# Patient Record
Sex: MALE | Race: ASIAN | URBAN - METROPOLITAN AREA
[De-identification: names, ages, dates, MRNs, and addresses within clinical notes are randomized per-mention and may not be internally consistent; named-entity substitution may affect disease eponyms.]

---

## 2021-04-28 ENCOUNTER — OFFICE VISIT (OUTPATIENT)
Dept: URBAN - METROPOLITAN AREA CLINIC 91 | Facility: CLINIC | Age: 71
End: 2021-04-28
Payer: COMMERCIAL

## 2021-04-28 DIAGNOSIS — H04.123 DRY EYE SYNDROME OF BILATERAL LACRIMAL GLANDS: ICD-10-CM

## 2021-04-28 DIAGNOSIS — H40.013 OPEN ANGLE WITH BORDERLINE FINDINGS, LOW RISK, BILATERAL: ICD-10-CM

## 2021-04-28 PROCEDURE — 99214 OFFICE O/P EST MOD 30 MIN: CPT | Performed by: OPHTHALMOLOGY

## 2021-04-28 ASSESSMENT — INTRAOCULAR PRESSURE
OS: 14
OD: 15

## 2021-04-28 NOTE — IMPRESSION/PLAN
Impression: Type 2 diabetes mellitus without complications: B45.5. Plan: No evidence of diabetic retinopathy seen on today's exam in either eye. Ophthalmic pathologies associated with diabetes explained to patient. I have stressed the importance of patient maintaining good blood sugar control. I will continue to monitor patient's ocular status closely. Patient will return for follow-up appointment and repeat dilation.

## 2021-07-28 ENCOUNTER — OFFICE VISIT (OUTPATIENT)
Dept: URBAN - METROPOLITAN AREA CLINIC 91 | Facility: CLINIC | Age: 71
End: 2021-07-28
Payer: COMMERCIAL

## 2021-07-28 DIAGNOSIS — H40.1131 PRIMARY OPEN-ANGLE GLAUCOMA, BILATERAL, MILD STAGE: Primary | ICD-10-CM

## 2021-07-28 PROCEDURE — 92133 CPTRZD OPH DX IMG PST SGM ON: CPT | Performed by: OPHTHALMOLOGY

## 2021-07-28 PROCEDURE — 99213 OFFICE O/P EST LOW 20 MIN: CPT | Performed by: OPHTHALMOLOGY

## 2021-07-28 PROCEDURE — 92083 EXTENDED VISUAL FIELD XM: CPT | Performed by: OPHTHALMOLOGY

## 2021-07-28 RX ORDER — LATANOPROST 50 UG/ML
0.005 % SOLUTION OPHTHALMIC
Qty: 7.5 | Refills: 1 | Status: INACTIVE
Start: 2021-07-28 | End: 2022-02-09

## 2021-07-28 ASSESSMENT — INTRAOCULAR PRESSURE
OS: 15
OD: 15

## 2021-07-28 NOTE — IMPRESSION/PLAN
Impression: Primary open-angle glaucoma, bilateral, mild stage: H40.1131. Plan: Due to progression in VF will start Latanoprost QHS OU. IOP's stable. Discussed primary open angle glaucoma of both eyes. I have stressed importance of patient compliance with follow-up appointments and using glaucoma drops as directed. I have explained the risk of irreversible vision loss and possible blindness associated with glaucoma. Will continue to monitor patient's glaucoma status with OCTg, visual field testing and fundus photography.

## 2021-08-27 ENCOUNTER — OFFICE VISIT (OUTPATIENT)
Dept: URBAN - METROPOLITAN AREA CLINIC 91 | Facility: CLINIC | Age: 71
End: 2021-08-27
Payer: COMMERCIAL

## 2021-08-27 PROCEDURE — 92014 COMPRE OPH EXAM EST PT 1/>: CPT | Performed by: OPHTHALMOLOGY

## 2021-08-27 ASSESSMENT — INTRAOCULAR PRESSURE
OD: 14
OS: 12

## 2021-12-22 ENCOUNTER — OFFICE VISIT (OUTPATIENT)
Dept: URBAN - METROPOLITAN AREA CLINIC 91 | Facility: CLINIC | Age: 71
End: 2021-12-22
Payer: COMMERCIAL

## 2021-12-22 DIAGNOSIS — E11.9 TYPE 2 DIABETES MELLITUS WITHOUT COMPLICATIONS: ICD-10-CM

## 2021-12-22 PROCEDURE — 99213 OFFICE O/P EST LOW 20 MIN: CPT | Performed by: OPHTHALMOLOGY

## 2021-12-22 ASSESSMENT — INTRAOCULAR PRESSURE
OD: 14
OS: 9

## 2021-12-22 NOTE — IMPRESSION/PLAN
Impression: Primary open-angle glaucoma, bilateral, mild stage: H40.1131. Plan: IOP's stable OU, Continue to Latanoprost QHS OU. Discussed primary open angle glaucoma of both eyes. I have stressed importance of patient compliance with follow-up appointments and using glaucoma drops as directed. I have explained the risk of irreversible vision loss and possible blindness associated with glaucoma. Will continue to monitor patient's glaucoma status with OCTg, visual field testing and fundus photography.

## 2021-12-22 NOTE — IMPRESSION/PLAN
Impression: Type 2 diabetes mellitus without complications: H88.8. Plan: No evidence of diabetic retinopathy seen on today's exam in either eye. Ophthalmic pathologies associated with diabetes explained to patient. I have stressed the importance of patient maintaining good blood sugar control. I will continue to monitor patient's ocular status closely. Patient will return for follow-up appointment and repeat dilation.

## 2022-01-28 ENCOUNTER — OFFICE VISIT (OUTPATIENT)
Dept: URBAN - METROPOLITAN AREA CLINIC 91 | Facility: CLINIC | Age: 72
End: 2022-01-28
Payer: MEDICARE

## 2022-01-28 DIAGNOSIS — H25.13 AGE-RELATED NUCLEAR CATARACT, BILATERAL: ICD-10-CM

## 2022-01-28 DIAGNOSIS — H35.033 HYPERTENSIVE RETINOPATHY, BILATERAL: Primary | ICD-10-CM

## 2022-01-28 DIAGNOSIS — H52.4 PRESBYOPIA: ICD-10-CM

## 2022-01-28 PROCEDURE — 99214 OFFICE O/P EST MOD 30 MIN: CPT | Performed by: OPHTHALMOLOGY

## 2022-01-28 PROCEDURE — 92134 CPTRZ OPH DX IMG PST SGM RTA: CPT | Performed by: OPHTHALMOLOGY

## 2022-01-28 ASSESSMENT — VISUAL ACUITY
OS: 20/25
OD: 20/20

## 2022-01-28 ASSESSMENT — INTRAOCULAR PRESSURE
OD: 12
OS: 10

## 2022-01-28 NOTE — IMPRESSION/PLAN
Impression: Primary open-angle glaucoma, bilateral, mild stage: H40.1131. Plan: IOP's stable OU, continue using Latanoprost QHS OU. Discussed primary open angle glaucoma of both eyes. I have stressed importance of patient compliance with follow-up appointments and using glaucoma drops as directed. I have explained the risk of irreversible vision loss and possible blindness associated with glaucoma. Will continue to monitor patient's glaucoma status with OCTg, visual field testing and fundus photography.

## 2022-01-28 NOTE — IMPRESSION/PLAN
Impression: Hypertensive retinopathy, bilateral: H35.033. Plan: Evidence of hypertensive retinopathy found. The patient was advised to see primary care physician to keep blood pressure under good control. Risk of sudden vision loss associated with hypertension was discussed with patient. If vision suddenly changes, patient should notify our office immediately. OCTm obtained and reviewed WNL.

## 2022-01-28 NOTE — IMPRESSION/PLAN
Impression: Type 2 diabetes mellitus without complications: F24.3. Plan: No evidence of diabetic retinopathy seen on today's exam in either eye. Ophthalmic pathologies associated with diabetes explained to patient. I have stressed the importance of patient maintaining good blood sugar control. I will continue to monitor patient's ocular status closely. Patient will return for follow-up appointment and repeat dilation.

## 2022-07-29 ENCOUNTER — OFFICE VISIT (OUTPATIENT)
Dept: URBAN - METROPOLITAN AREA CLINIC 91 | Facility: CLINIC | Age: 72
End: 2022-07-29
Payer: MEDICARE

## 2022-07-29 DIAGNOSIS — H40.1131 PRIMARY OPEN-ANGLE GLAUCOMA, BILATERAL, MILD STAGE: Primary | ICD-10-CM

## 2022-07-29 DIAGNOSIS — H35.033 HYPERTENSIVE RETINOPATHY, BILATERAL: ICD-10-CM

## 2022-07-29 DIAGNOSIS — E11.9 TYPE 2 DIABETES MELLITUS WITHOUT COMPLICATIONS: ICD-10-CM

## 2022-07-29 DIAGNOSIS — H25.13 AGE-RELATED NUCLEAR CATARACT, BILATERAL: ICD-10-CM

## 2022-07-29 PROCEDURE — 99214 OFFICE O/P EST MOD 30 MIN: CPT | Performed by: OPHTHALMOLOGY

## 2022-07-29 ASSESSMENT — VISUAL ACUITY
OD: 20/25
OS: 20/20

## 2022-07-29 ASSESSMENT — INTRAOCULAR PRESSURE
OD: 12
OS: 10

## 2022-07-29 NOTE — IMPRESSION/PLAN
Impression: Type 2 diabetes mellitus without complications: D68.3. Plan: No evidence of diabetic retinopathy seen on today's exam in either eye. Ophthalmic pathologies associated with diabetes explained to patient. I have stressed the importance of patient maintaining good blood sugar control. I will continue to monitor patient's ocular status closely. Patient will return for follow-up appointment and repeat dilation.

## 2023-02-24 ENCOUNTER — OFFICE VISIT (OUTPATIENT)
Dept: URBAN - METROPOLITAN AREA CLINIC 91 | Facility: CLINIC | Age: 73
End: 2023-02-24
Payer: MEDICARE

## 2023-02-24 DIAGNOSIS — H40.1131 PRIMARY OPEN-ANGLE GLAUCOMA, BILATERAL, MILD STAGE: Primary | ICD-10-CM

## 2023-02-24 PROCEDURE — 99213 OFFICE O/P EST LOW 20 MIN: CPT | Performed by: OPHTHALMOLOGY

## 2023-02-24 PROCEDURE — 92083 EXTENDED VISUAL FIELD XM: CPT | Performed by: OPHTHALMOLOGY

## 2023-02-24 ASSESSMENT — INTRAOCULAR PRESSURE
OD: 12
OS: 14

## 2023-02-24 ASSESSMENT — VISUAL ACUITY
OD: 20/25
OS: 20/20

## 2023-02-24 NOTE — IMPRESSION/PLAN
Impression: Primary open-angle glaucoma, bilateral, mild stage: H40.1131. Plan: Due to great pressures without Latanaprost will stop and will recheck IOP in 3 months. Discussed primary open angle glaucoma of both eyes. I have stressed importance of patient compliance with follow-up appointments and using glaucoma drops as directed. I have explained the risk of irreversible vision loss and possible blindness associated with glaucoma. Will continue to monitor patient's glaucoma status with OCTg, visual field testing and fundus photography.

## 2023-05-31 ENCOUNTER — OFFICE VISIT (OUTPATIENT)
Dept: URBAN - METROPOLITAN AREA CLINIC 91 | Facility: CLINIC | Age: 73
End: 2023-05-31
Payer: MEDICARE

## 2023-05-31 DIAGNOSIS — H40.1131 PRIMARY OPEN-ANGLE GLAUCOMA, BILATERAL, MILD STAGE: Primary | ICD-10-CM

## 2023-05-31 PROCEDURE — 99213 OFFICE O/P EST LOW 20 MIN: CPT | Performed by: OPHTHALMOLOGY

## 2023-05-31 PROCEDURE — 92020 GONIOSCOPY: CPT | Performed by: OPHTHALMOLOGY

## 2023-05-31 ASSESSMENT — INTRAOCULAR PRESSURE
OS: 14
OD: 14

## 2023-05-31 NOTE — IMPRESSION/PLAN
Impression: Primary open-angle glaucoma, bilateral, mild stage: H40.1131. Plan: IOP continue to remain stable off latanoprost. Continue without drop treatment. Monitor. Discussed primary open angle glaucoma of both eyes. I have stressed importance of patient compliance with follow-up appointments and using glaucoma drops as directed. I have explained the risk of irreversible vision loss and possible blindness associated with glaucoma. Will continue to monitor patient's glaucoma status with OCTg, visual field testing and fundus photography.

## 2023-09-26 ENCOUNTER — OFFICE VISIT (OUTPATIENT)
Dept: URBAN - METROPOLITAN AREA CLINIC 91 | Facility: CLINIC | Age: 73
End: 2023-09-26
Payer: MEDICARE

## 2023-09-26 DIAGNOSIS — H25.13 AGE-RELATED NUCLEAR CATARACT, BILATERAL: ICD-10-CM

## 2023-09-26 DIAGNOSIS — H52.03 HYPERMETROPIA, BILATERAL: ICD-10-CM

## 2023-09-26 DIAGNOSIS — H52.4 PRESBYOPIA: ICD-10-CM

## 2023-09-26 DIAGNOSIS — H40.1131 PRIMARY OPEN-ANGLE GLAUCOMA, BILATERAL, MILD STAGE: Primary | ICD-10-CM

## 2023-09-26 PROCEDURE — 99213 OFFICE O/P EST LOW 20 MIN: CPT | Performed by: OPHTHALMOLOGY

## 2023-09-26 ASSESSMENT — VISUAL ACUITY
OD: 20/20
OS: 20/20

## 2023-09-26 ASSESSMENT — INTRAOCULAR PRESSURE
OS: 10
OD: 10

## 2024-01-23 ENCOUNTER — OFFICE VISIT (OUTPATIENT)
Dept: URBAN - METROPOLITAN AREA CLINIC 91 | Facility: CLINIC | Age: 74
End: 2024-01-23
Payer: MEDICARE

## 2024-01-23 DIAGNOSIS — H25.13 AGE-RELATED NUCLEAR CATARACT, BILATERAL: ICD-10-CM

## 2024-01-23 DIAGNOSIS — H40.1131 PRIMARY OPEN-ANGLE GLAUCOMA, BILATERAL, MILD STAGE: Primary | ICD-10-CM

## 2024-01-23 PROCEDURE — 99213 OFFICE O/P EST LOW 20 MIN: CPT | Performed by: SPECIALIST

## 2024-01-23 PROCEDURE — 92083 EXTENDED VISUAL FIELD XM: CPT | Performed by: SPECIALIST

## 2024-01-23 PROCEDURE — 76514 ECHO EXAM OF EYE THICKNESS: CPT | Performed by: SPECIALIST

## 2024-01-23 ASSESSMENT — INTRAOCULAR PRESSURE
OS: 11
OD: 12